# Patient Record
Sex: FEMALE | Race: WHITE | Employment: FULL TIME | URBAN - METROPOLITAN AREA
[De-identification: names, ages, dates, MRNs, and addresses within clinical notes are randomized per-mention and may not be internally consistent; named-entity substitution may affect disease eponyms.]

---

## 2018-08-07 ENCOUNTER — HOSPITAL PATHOLOGY (OUTPATIENT)
Dept: OTHER | Facility: CLINIC | Age: 33
End: 2018-08-07

## 2018-08-09 LAB — COPATH REPORT: NORMAL

## 2021-09-26 ENCOUNTER — HOSPITAL ENCOUNTER (EMERGENCY)
Facility: CLINIC | Age: 36
Discharge: HOME | End: 2021-09-26
Attending: EMERGENCY MEDICINE | Admitting: EMERGENCY MEDICINE
Payer: COMMERCIAL

## 2021-09-26 VITALS
SYSTOLIC BLOOD PRESSURE: 132 MMHG | RESPIRATION RATE: 16 BRPM | OXYGEN SATURATION: 99 % | HEIGHT: 70 IN | TEMPERATURE: 98.1 F | HEART RATE: 82 BPM | DIASTOLIC BLOOD PRESSURE: 82 MMHG

## 2021-09-26 DIAGNOSIS — S61.411A LACERATION OF RIGHT HAND WITHOUT FOREIGN BODY, INITIAL ENCOUNTER: ICD-10-CM

## 2021-09-26 PROCEDURE — 90471 IMMUNIZATION ADMIN: CPT | Performed by: EMERGENCY MEDICINE

## 2021-09-26 PROCEDURE — 99283 EMERGENCY DEPT VISIT LOW MDM: CPT | Mod: 25

## 2021-09-26 PROCEDURE — 90715 TDAP VACCINE 7 YRS/> IM: CPT | Performed by: EMERGENCY MEDICINE

## 2021-09-26 PROCEDURE — 12001 RPR S/N/AX/GEN/TRNK 2.5CM/<: CPT

## 2021-09-26 PROCEDURE — 250N000011 HC RX IP 250 OP 636: Performed by: EMERGENCY MEDICINE

## 2021-09-26 RX ADMIN — CLOSTRIDIUM TETANI TOXOID ANTIGEN (FORMALDEHYDE INACTIVATED), CORYNEBACTERIUM DIPHTHERIAE TOXOID ANTIGEN (FORMALDEHYDE INACTIVATED), BORDETELLA PERTUSSIS TOXOID ANTIGEN (GLUTARALDEHYDE INACTIVATED), BORDETELLA PERTUSSIS FILAMENTOUS HEMAGGLUTININ ANTIGEN (FORMALDEHYDE INACTIVATED), BORDETELLA PERTUSSIS PERTACTIN ANTIGEN, AND BORDETELLA PERTUSSIS FIMBRIAE 2/3 ANTIGEN 0.5 ML: 5; 2; 2.5; 5; 3; 5 INJECTION, SUSPENSION INTRAMUSCULAR at 11:31

## 2021-09-26 ASSESSMENT — ENCOUNTER SYMPTOMS
HEADACHES: 0
SHORTNESS OF BREATH: 0
WEAKNESS: 0
WOUND: 1
ABDOMINAL PAIN: 0
FATIGUE: 0
FEVER: 0
COUGH: 0
DYSURIA: 0
CHILLS: 0
VOMITING: 0

## 2021-09-26 NOTE — ED PROVIDER NOTES
EMERGENCY DEPARTMENT ENCOUNTER      NAME: Lin Rodriguez  AGE: 36 year old female  YOB: 1985  MRN: [id number]  EVALUATION DATE & TIME: 9/26/2021 11:00 AM    PCP: No primary care provider on file.    ED PROVIDER: Antoni Kaur M.D.      Chief Complaint   Patient presents with     Hand Injury         FINAL IMPRESSION:  1. Laceration of right hand without foreign body, initial encounter          ED COURSE & MEDICAL DECISION MAKING:    Pertinent Labs & Imaging studies reviewed. (See chart for details)  ED Course as of Sep 26 1155   Sun Sep 26, 2021   1124 Patient is a 36-year-old woman who presents with injury to her right hand.  She accidentally stabbed the palm of the hand with a knife while cutting a bagel.  She has normal tendon function, neurologic function, cap refill distally.  The wound was anesthetized, will be irrigated and closed with single stitch.  She was updated on tetanus.  Plan to discharge with 1 week follow-up, instructions to protect the wound and not use the hand for the next 5 days.            Additional ED Course Timestamps:  11:09 AM I met with the patient to gather history and to perform my initial exam. I discussed the plan for care while in the Emergency Department.    At the conclusion of the encounter I discussed the results of all of the tests and the disposition. The questions were answered. The patient or family acknowledged understanding and was agreeable with the care plan.     PPE: Provider wore gloves, N95 mask, eye protection, surgical cap, and paper mask.      MEDICATIONS GIVEN IN THE EMERGENCY:  Medications   Tdap (tetanus-diphtheria-acell pertussis) (ADACEL) injection 0.5 mL (0.5 mLs Intramuscular Given 9/26/21 1131)         NEW PRESCRIPTIONS STARTED AT TODAY'S ER VISIT  New Prescriptions    No medications on file          =================================================================    HPI    Patient information was obtained from: the patient     Use of  : N/A       Lin LEE Michael is a 36 year old female with a reviewed and nothing pertinent history who presents to this ED via walk-in for evaluation of hand injury.     The patient reports to have lacerated her right hand with a knife while cutting a bagel this morning. She initially felt some tingling but has mostly resolved aside from some in her middle finger. Still has pain but is able to move all fingers without much difficulty and in unsure if her tetanus shot is up to date. The patient denies any other symptoms or complaints at this time.        REVIEW OF SYSTEMS   Review of Systems   Constitutional: Negative for chills, fatigue and fever.   Respiratory: Negative for cough and shortness of breath.    Cardiovascular: Negative for chest pain.   Gastrointestinal: Negative for abdominal pain and vomiting.   Genitourinary: Negative for dysuria.   Skin: Positive for wound (laceration to right hand ).   Neurological: Negative for syncope, weakness and headaches.   All other systems reviewed and are negative.       PAST MEDICAL HISTORY:  No past medical history on file.    PAST SURGICAL HISTORY:  No past surgical history on file.        CURRENT MEDICATIONS:    No current facility-administered medications for this encounter.     No current outpatient medications on file.       ALLERGIES:  No Known Allergies    FAMILY HISTORY:  No family history on file.    SOCIAL HISTORY:   Social History     Socioeconomic History     Marital status: Not on file     Spouse name: Not on file     Number of children: Not on file     Years of education: Not on file     Highest education level: Not on file   Occupational History     Not on file   Tobacco Use     Smoking status: Not on file   Substance and Sexual Activity     Alcohol use: Not on file     Drug use: Not on file     Sexual activity: Not on file   Other Topics Concern     Not on file   Social History Narrative     Not on file     Social Determinants of Health  "    Financial Resource Strain:      Difficulty of Paying Living Expenses:    Food Insecurity:      Worried About Running Out of Food in the Last Year:      Ran Out of Food in the Last Year:    Transportation Needs:      Lack of Transportation (Medical):      Lack of Transportation (Non-Medical):    Physical Activity:      Days of Exercise per Week:      Minutes of Exercise per Session:    Stress:      Feeling of Stress :    Social Connections:      Frequency of Communication with Friends and Family:      Frequency of Social Gatherings with Friends and Family:      Attends [employer]odoxy Services:      Active Member of Clubs or Organizations:      Attends Club or Organization Meetings:      Marital Status:    Intimate Partner Violence:      Fear of Current or Ex-Partner:      Emotionally Abused:      Physically Abused:      Sexually Abused:        VITALS:  /85   Pulse 86   Temp 98.1  F (36.7  C) (Oral)   Resp 22   Ht 1.778 m (5' 10\")   LMP 09/21/2021 (Approximate)   SpO2 99%   Breastfeeding No     PHYSICAL EXAM    Constitutional: Well developed, well nourished. Uncomfortable appearing.   HENT: Normocephalic, atraumatic, mucous membranes moist, nose normal  Eyes: PERRL, EOMI, conjunctiva normal, no discharge.  Neck- Supple, gross ROM intact.   Respiratory: Normal work of breathing, normal rate, speaks in full sentences  Cardiovascular: Normal heart rate  Musculoskeletal: 1.5 cm puncture wound to palm of right hand. Hemostatic. Normal cap refill in fingers. 5/5 strength in FDS/FDP in fingers normal sensation to light touch. No involvement of thenar eminence.   Neurologic: Alert & oriented x 3, cranial nerves grossly intact. Normal gross coordination  Psychiatric: Affect normal, cooperative.         LAB:  All pertinent labs reviewed and interpreted.  Labs Ordered and Resulted from Time of ED Arrival Up to the Time of Departure from the ED - No data to display    RADIOLOGY:  Reviewed all pertinent imaging. Please " see official radiology report.  No orders to display       EKG:    All EKG interpretations will be found in ED course above.    PROCEDURES:   PROCEDURE: Laceration Repair   INDICATIONS: Laceration   PROCEDURE PROVIDER: Gustavo Kaur   SITE: Right palm   TYPE/SIZE: subcutaneous, clean and no foreign body visualized  1.5 cm (total length)   FUNCTIONAL ASSESSMENT: Distal sensation, circulation, motor and tendon function intact   MEDICATION: 3 mLs of 2% Lidocaine without epinephrine   PREPARATION: irrigation with Normal saline   DEBRIDEMENT: wound explored, no foreign body found   CLOSURE:  Wound was closed in   one layer: Skin closed with 1 stitches of 5-0 Ethilon    Total number of sutures/staples placed: 1             I, Christiano Peña am serving as a scribe to document services personally performed by Dr. Antoni Kaur based on my observation and the provider's statements to me. I, Antoni Kaur MD attest that Christiano Peña is acting in a scribe capacity, has observed my performance of the services and has documented them in accordance with my direction.    Antoni Kaur M.D.  Emergency Medicine  [facility] EMERGENCY ROOM  [address] [address]  [phone]  Dept: [phone]       Antoni Kaur MD  09/26/21 1659

## 2021-09-26 NOTE — ED TRIAGE NOTES
Patient presents to the ED with complaints of a hand laceration. She reports she cut herself while slicing a bagel earlier this am.

## 2021-09-26 NOTE — Clinical Note
Lin Rodriguez was seen and treated in our emergency department on 9/26/2021.  She may return to work on 09/28/2021.       If you have any questions or concerns, please don't hesitate to call.      Antoni Kaur MD

## 2021-09-26 NOTE — DISCHARGE INSTRUCTIONS
Please take Tylenol, ibuprofen for pain.  Please do not lie to get wet for 4 days.  Make sure you change the dressing daily, you can reapply bacitracin, Neosporin, or other ointments to keep the wound clean.  Do not use the hand for the next couple of days as much as possible, this will help the wound healing begin.